# Patient Record
Sex: FEMALE | Race: WHITE | Employment: FULL TIME | URBAN - METROPOLITAN AREA
[De-identification: names, ages, dates, MRNs, and addresses within clinical notes are randomized per-mention and may not be internally consistent; named-entity substitution may affect disease eponyms.]

---

## 2020-08-07 ENCOUNTER — HOSPITAL ENCOUNTER (EMERGENCY)
Age: 45
Discharge: HOME | End: 2020-08-07
Attending: EMERGENCY MEDICINE
Payer: COMMERCIAL

## 2020-08-07 VITALS
HEART RATE: 95 BPM | RESPIRATION RATE: 16 BRPM | SYSTOLIC BLOOD PRESSURE: 125 MMHG | WEIGHT: 126 LBS | OXYGEN SATURATION: 100 % | DIASTOLIC BLOOD PRESSURE: 78 MMHG | TEMPERATURE: 96.7 F | HEIGHT: 61 IN | BODY MASS INDEX: 23.79 KG/M2

## 2020-08-07 PROCEDURE — 4500000028 HC INTERMEDIATE PROCEDURE

## 2020-08-07 PROCEDURE — 99282 EMERGENCY DEPT VISIT SF MDM: CPT

## 2020-08-07 RX ORDER — NAPROXEN 500 MG/1
500 TABLET ORAL 2 TIMES DAILY PRN
Qty: 20 TABLET | Refills: 0 | Status: SHIPPED | OUTPATIENT
Start: 2020-08-07 | End: 2020-08-17

## 2020-08-07 RX ORDER — CITALOPRAM 10 MG/1
10 TABLET ORAL DAILY
COMMUNITY

## 2020-08-07 RX ORDER — ALBUTEROL SULFATE 90 UG/1
2 AEROSOL, METERED RESPIRATORY (INHALATION) EVERY 6 HOURS PRN
COMMUNITY

## 2020-08-07 RX ORDER — DICYCLOMINE HYDROCHLORIDE 10 MG/5ML
20 SOLUTION ORAL
COMMUNITY

## 2020-08-07 RX ORDER — ATENOLOL 50 MG/1
50 TABLET ORAL DAILY
COMMUNITY

## 2020-08-07 RX ORDER — ATORVASTATIN CALCIUM 20 MG/1
20 TABLET, FILM COATED ORAL DAILY
COMMUNITY

## 2020-08-07 SDOH — HEALTH STABILITY: MENTAL HEALTH: HOW OFTEN DO YOU HAVE A DRINK CONTAINING ALCOHOL?: NEVER

## 2020-08-07 ASSESSMENT — PAIN DESCRIPTION - ORIENTATION: ORIENTATION: RIGHT

## 2020-08-07 ASSESSMENT — PAIN DESCRIPTION - DESCRIPTORS: DESCRIPTORS: SHOOTING

## 2020-08-07 ASSESSMENT — ENCOUNTER SYMPTOMS
DIARRHEA: 0
ABDOMINAL PAIN: 0
VOMITING: 0
COLOR CHANGE: 0
EYES NEGATIVE: 1
SHORTNESS OF BREATH: 0
NAUSEA: 0

## 2020-08-07 ASSESSMENT — PAIN DESCRIPTION - FREQUENCY: FREQUENCY: INTERMITTENT

## 2020-08-07 ASSESSMENT — PAIN SCALES - GENERAL: PAINLEVEL_OUTOF10: 6

## 2020-08-07 ASSESSMENT — PAIN DESCRIPTION - PAIN TYPE: TYPE: ACUTE PAIN

## 2020-08-07 NOTE — ED TRIAGE NOTES
Pt arrives with complaints of right thumb pain that just started earlier today while eating lunch.  She states she picked up a glass and felt a sharp pain to her thumb and she noticed how the thumb then started to swell, pain is worse with any type of

## 2020-08-07 NOTE — ED PROVIDER NOTES
Lamont Morgan is a right hand dominant female who works at a factory \"packing parts\". This morning at work she picked up a coffee cup and noticed pain in the dorsum of her right thumb, and had trouble gripping the cup. Pain is over the dorsum of the proximal phalynx, radiates into her forearm. She denies direct trauma. No numbness appreciated. She has no previous history of this in the past.        /78   Pulse 95   Temp 96.7 °F (35.9 °C) (Skin)   Resp 16   Ht 5' 1\" (1.549 m)   Wt 126 lb (57.2 kg)   SpO2 100%   BMI 23.81 kg/m²     I have reviewed the following from the nursing documentation:      Prior to Admission medications    Medication Sig Start Date End Date Taking? Authorizing Provider   atenolol (TENORMIN) 50 MG tablet Take 50 mg by mouth daily   Yes Historical Provider, MD   citalopram (CELEXA) 10 MG tablet Take 10 mg by mouth daily   Yes Historical Provider, MD   atorvastatin (LIPITOR) 20 MG tablet Take 20 mg by mouth daily   Yes Historical Provider, MD   dicyclomine (BENTYL) 10 MG/5ML syrup Take 20 mg by mouth 4 times daily (before meals and nightly)   Yes Historical Provider, MD   albuterol sulfate HFA (VENTOLIN HFA) 108 (90 Base) MCG/ACT inhaler Inhale 2 puffs into the lungs every 6 hours as needed for Wheezing   Yes Historical Provider, MD   naproxen (NAPROSYN) 500 MG tablet Take 1 tablet by mouth 2 times daily as needed for Pain 8/7/20 8/17/20 Yes Shamar Peguero MD       Allergies as of 08/07/2020 - Review Complete 08/07/2020   Allergen Reaction Noted    Iv [iodides] Hives 08/07/2020       Past Medical History:   Diagnosis Date    Asthma     Tachycardia         Surgical History:   Past Surgical History:   Procedure Laterality Date    HYSTERECTOMY          Family History:  History reviewed. No pertinent family history.     Social History     Socioeconomic History    Marital status: Single     Spouse name: Not on file    Number of children: Not on file    Years of education: Head: Normocephalic. Eyes:      Pupils: Pupils are equal, round, and reactive to light. Pulmonary:      Effort: Pulmonary effort is normal. No respiratory distress. Skin:     General: Skin is warm and dry. Capillary Refill: Capillary refill takes less than 2 seconds. Coloration: Skin is not pale. Findings: No erythema or rash. Comments: She has FROM of the wrist and all MP/PIP/DIP joints or the right hand, including the thumb. She has some mild soft tissue swelling of the thumb, but no evidence of cellulitis or tenosynovitis. Maximal tenderness is over the extensor pollicus longus tendon. She is able to oppose the thumb and fifth finger, but this causes pain. No focal sensory or motor deficit; all components of the brachial plexus tested are symmetric and intact. Neurological:      General: No focal deficit present. Mental Status: She is alert and oriented to person, place, and time. Psychiatric:         Mood and Affect: Mood normal.         Behavior: Behavior normal.          Procedures     MDM   No results found for this visit on 08/07/20. I estimate there is LOW risk for COMPARTMENT SYNDROME, DEEP VENOUS THROMBOSIS, SEPTIC ARTHRITIS, TENDON OR NEUROVASCULAR INJURY, thus I consider the discharge disposition reasonable. Andrew Lawton and I have discussed the diagnosis and risks, and we agree with discharging home to follow-up with their primary doctor or the referral orthopedist. We also discussed returning to the Emergency Department immediately if new or worsening symptoms occur. We have discussed the symptoms which are most concerning (e.g., changing or worsening pain, numbness, weakness) that necessitate immediate return. Final Impression    1. Thumb tendonitis        Blood pressure 125/78, pulse 95, temperature 96.7 °F (35.9 °C), temperature source Skin, resp. rate 16, height 5' 1\" (1.549 m), weight 126 lb (57.2 kg), SpO2 100 %.          Broderick Oas, MD  08/07/20 145

## 2020-08-10 NOTE — ED NOTES
I accessed this chart to send the chart to [address] as requested and after receiving the release of information. The office and the patient had both called for this information.       Major Montoya RN  08/10/20 0395

## 2023-03-03 ENCOUNTER — APPOINTMENT (OUTPATIENT)
Dept: CT IMAGING | Age: 48
End: 2023-03-03

## 2023-03-03 ENCOUNTER — APPOINTMENT (OUTPATIENT)
Dept: GENERAL RADIOLOGY | Age: 48
End: 2023-03-03

## 2023-03-03 ENCOUNTER — HOSPITAL ENCOUNTER (EMERGENCY)
Age: 48
Discharge: HOME | End: 2023-03-03

## 2023-03-03 VITALS
DIASTOLIC BLOOD PRESSURE: 88 MMHG | HEART RATE: 76 BPM | RESPIRATION RATE: 18 BRPM | TEMPERATURE: 98.1 F | SYSTOLIC BLOOD PRESSURE: 132 MMHG | OXYGEN SATURATION: 96 %

## 2023-03-03 DIAGNOSIS — R07.9 CHEST PAIN, UNSPECIFIED TYPE: ICD-10-CM

## 2023-03-03 DIAGNOSIS — N64.4 BREAST PAIN: Primary | ICD-10-CM

## 2023-03-03 LAB
ALBUMIN SERPL-MCNC: 4.1 GM/DL (ref 3.4–5)
ALP BLD-CCNC: 69 IU/L (ref 40–129)
ALT SERPL-CCNC: 15 U/L (ref 10–40)
ANION GAP SERPL CALCULATED.3IONS-SCNC: 7 MMOL/L (ref 4–16)
AST SERPL-CCNC: 21 IU/L (ref 15–37)
BASOPHILS ABSOLUTE: 0.1 K/CU MM
BASOPHILS RELATIVE PERCENT: 0.7 % (ref 0–1)
BILIRUB SERPL-MCNC: 0.6 MG/DL (ref 0–1)
BUN SERPL-MCNC: 18 MG/DL (ref 6–23)
CALCIUM SERPL-MCNC: 9 MG/DL (ref 8.3–10.6)
CHLORIDE BLD-SCNC: 104 MMOL/L (ref 99–110)
CO2: 29 MMOL/L (ref 21–32)
CREAT SERPL-MCNC: 0.6 MG/DL (ref 0.6–1.1)
D DIMER: 708 NG/ML(DDU)
DIFFERENTIAL TYPE: ABNORMAL
EKG ATRIAL RATE: 75 BPM
EKG DIAGNOSIS: NORMAL
EKG P AXIS: 57 DEGREES
EKG P-R INTERVAL: 134 MS
EKG Q-T INTERVAL: 390 MS
EKG QRS DURATION: 86 MS
EKG QTC CALCULATION (BAZETT): 435 MS
EKG R AXIS: 28 DEGREES
EKG T AXIS: 31 DEGREES
EKG VENTRICULAR RATE: 75 BPM
EOSINOPHILS ABSOLUTE: 0.5 K/CU MM
EOSINOPHILS RELATIVE PERCENT: 7.1 % (ref 0–3)
GFR SERPL CREATININE-BSD FRML MDRD: >60 ML/MIN/1.73M2
GLUCOSE SERPL-MCNC: 102 MG/DL (ref 70–99)
HCT VFR BLD CALC: 39.2 % (ref 37–47)
HEMOGLOBIN: 12.6 GM/DL (ref 12.5–16)
IMMATURE NEUTROPHIL %: 0.3 % (ref 0–0.43)
LYMPHOCYTES ABSOLUTE: 1.5 K/CU MM
LYMPHOCYTES RELATIVE PERCENT: 21.3 % (ref 24–44)
MCH RBC QN AUTO: 31.1 PG (ref 27–31)
MCHC RBC AUTO-ENTMCNC: 32.1 % (ref 32–36)
MCV RBC AUTO: 96.8 FL (ref 78–100)
MONOCYTES ABSOLUTE: 0.7 K/CU MM
MONOCYTES RELATIVE PERCENT: 9 % (ref 0–4)
NUCLEATED RBC %: 0 %
PDW BLD-RTO: 12.2 % (ref 11.7–14.9)
PLATELET # BLD: 205 K/CU MM (ref 140–440)
PMV BLD AUTO: 8.9 FL (ref 7.5–11.1)
POTASSIUM SERPL-SCNC: 3.8 MMOL/L (ref 3.5–5.1)
RBC # BLD: 4.05 M/CU MM (ref 4.2–5.4)
SEGMENTED NEUTROPHILS ABSOLUTE COUNT: 4.4 K/CU MM
SEGMENTED NEUTROPHILS RELATIVE PERCENT: 61.6 % (ref 36–66)
SODIUM BLD-SCNC: 140 MMOL/L (ref 135–145)
TOTAL IMMATURE NEUTOROPHIL: 0.02 K/CU MM
TOTAL NUCLEATED RBC: 0 K/CU MM
TOTAL PROTEIN: 6.4 GM/DL (ref 6.4–8.2)
TROPONIN T: <0.01 NG/ML
TROPONIN T: <0.01 NG/ML
WBC # BLD: 7.2 K/CU MM (ref 4–10.5)

## 2023-03-03 PROCEDURE — 6370000000 HC RX 637 (ALT 250 FOR IP): Performed by: PHYSICIAN ASSISTANT

## 2023-03-03 PROCEDURE — 84484 ASSAY OF TROPONIN QUANT: CPT

## 2023-03-03 PROCEDURE — 71045 X-RAY EXAM CHEST 1 VIEW: CPT

## 2023-03-03 PROCEDURE — 93005 ELECTROCARDIOGRAM TRACING: CPT | Performed by: PHYSICIAN ASSISTANT

## 2023-03-03 PROCEDURE — 6360000002 HC RX W HCPCS: Performed by: PHYSICIAN ASSISTANT

## 2023-03-03 PROCEDURE — 85025 COMPLETE CBC W/AUTO DIFF WBC: CPT

## 2023-03-03 PROCEDURE — 96375 TX/PRO/DX INJ NEW DRUG ADDON: CPT

## 2023-03-03 PROCEDURE — 99285 EMERGENCY DEPT VISIT HI MDM: CPT

## 2023-03-03 PROCEDURE — 71275 CT ANGIOGRAPHY CHEST: CPT

## 2023-03-03 PROCEDURE — 93010 ELECTROCARDIOGRAM REPORT: CPT | Performed by: INTERNAL MEDICINE

## 2023-03-03 PROCEDURE — 96374 THER/PROPH/DIAG INJ IV PUSH: CPT

## 2023-03-03 PROCEDURE — 96372 THER/PROPH/DIAG INJ SC/IM: CPT

## 2023-03-03 PROCEDURE — 80053 COMPREHEN METABOLIC PANEL: CPT

## 2023-03-03 PROCEDURE — 85379 FIBRIN DEGRADATION QUANT: CPT

## 2023-03-03 PROCEDURE — 2580000003 HC RX 258: Performed by: PHYSICIAN ASSISTANT

## 2023-03-03 PROCEDURE — 6360000004 HC RX CONTRAST MEDICATION: Performed by: PHYSICIAN ASSISTANT

## 2023-03-03 RX ORDER — DIPHENHYDRAMINE HYDROCHLORIDE 50 MG/ML
25 INJECTION INTRAMUSCULAR; INTRAVENOUS ONCE
Status: COMPLETED | OUTPATIENT
Start: 2023-03-03 | End: 2023-03-03

## 2023-03-03 RX ORDER — ASPIRIN 325 MG
325 TABLET ORAL ONCE
Status: COMPLETED | OUTPATIENT
Start: 2023-03-03 | End: 2023-03-03

## 2023-03-03 RX ORDER — HYDROCODONE BITARTRATE AND ACETAMINOPHEN 5; 325 MG/1; MG/1
1 TABLET ORAL EVERY 6 HOURS PRN
Qty: 10 TABLET | Refills: 0 | Status: SHIPPED | OUTPATIENT
Start: 2023-03-03 | End: 2023-03-08

## 2023-03-03 RX ORDER — 0.9 % SODIUM CHLORIDE 0.9 %
500 INTRAVENOUS SOLUTION INTRAVENOUS ONCE
Status: COMPLETED | OUTPATIENT
Start: 2023-03-03 | End: 2023-03-03

## 2023-03-03 RX ORDER — KETOROLAC TROMETHAMINE 30 MG/ML
30 INJECTION, SOLUTION INTRAMUSCULAR; INTRAVENOUS ONCE
Status: COMPLETED | OUTPATIENT
Start: 2023-03-03 | End: 2023-03-03

## 2023-03-03 RX ORDER — NAPROXEN 500 MG/1
500 TABLET ORAL 2 TIMES DAILY
Qty: 60 TABLET | Refills: 0 | Status: SHIPPED | OUTPATIENT
Start: 2023-03-03

## 2023-03-03 RX ORDER — METHYLPREDNISOLONE SODIUM SUCCINATE 125 MG/2ML
60 INJECTION, POWDER, LYOPHILIZED, FOR SOLUTION INTRAMUSCULAR; INTRAVENOUS ONCE
Status: COMPLETED | OUTPATIENT
Start: 2023-03-03 | End: 2023-03-03

## 2023-03-03 RX ORDER — HYDROCODONE BITARTRATE AND ACETAMINOPHEN 5; 325 MG/1; MG/1
1 TABLET ORAL ONCE
Status: DISCONTINUED | OUTPATIENT
Start: 2023-03-03 | End: 2023-03-03

## 2023-03-03 RX ORDER — MORPHINE SULFATE 4 MG/ML
4 INJECTION, SOLUTION INTRAMUSCULAR; INTRAVENOUS ONCE
Status: DISCONTINUED | OUTPATIENT
Start: 2023-03-03 | End: 2023-03-03 | Stop reason: HOSPADM

## 2023-03-03 RX ADMIN — METHYLPREDNISOLONE SODIUM SUCCINATE 60 MG: 125 INJECTION, POWDER, FOR SOLUTION INTRAMUSCULAR; INTRAVENOUS at 16:34

## 2023-03-03 RX ADMIN — KETOROLAC TROMETHAMINE 30 MG: 30 INJECTION, SOLUTION INTRAMUSCULAR; INTRAVENOUS at 15:09

## 2023-03-03 RX ADMIN — ASPIRIN 325 MG: 325 TABLET ORAL at 15:17

## 2023-03-03 RX ADMIN — SODIUM CHLORIDE 500 ML: 9 INJECTION, SOLUTION INTRAVENOUS at 16:40

## 2023-03-03 RX ADMIN — IOPAMIDOL 80 ML: 755 INJECTION, SOLUTION INTRAVENOUS at 17:35

## 2023-03-03 RX ADMIN — DIPHENHYDRAMINE HYDROCHLORIDE 25 MG: 50 INJECTION, SOLUTION INTRAMUSCULAR; INTRAVENOUS at 16:35

## 2023-03-03 ASSESSMENT — HEART SCORE: ECG: 1

## 2023-03-03 ASSESSMENT — PAIN SCALES - GENERAL: PAINLEVEL_OUTOF10: 5

## 2023-03-03 NOTE — Clinical Note
Logan Gibson was seen and treated in our emergency department on 3/3/2023. She may return to work on 03/06/2023. If you have any questions or concerns, please don't hesitate to call.       CANDI Montesinos

## 2023-03-03 NOTE — ED PROVIDER NOTES
Rhythm: NSR     - Ventricular rate 75  - OR interval 134  - Axis: normal  - ST changes: No STEMI  - T waves: TWI, anterior leads  - Q waves: none  - No, Q-T prolongation, WPW, Q waves suggestive of HOCM, V-tach/V-fib, A-fib, A-flutter, SVT, torsardes or Brugada.   -Previous EKG: none available on our records    EKG interpreted by me

## 2023-03-04 NOTE — ED PROVIDER NOTES
EMERGENCY DEPARTMENT ENCOUNTER        Pt Name: Mar Hernandez  MRN: [id number]  [place] 1975  Date of evaluation: 3/3/2023  Provider: CANDI Sullivan  PCP: Emir Puente DO    RUBINA. I have evaluated this patient. My supervising physician was available for consultation. Triage CHIEF COMPLAINT       Chief Complaint   Patient presents with    Breast Pain     States having right breast pain x4 days         HISTORY OF PRESENT ILLNESS      Chief Complaint: Breast pain    Mar Hernandez is a 50 y.o. female who presents to the emergency department today with a chief complaint of right-sided breast pain sent in from work. This is some has been lingering over for a procedure at work when she \"heard a popping sensation\" in her chest and then started having worse pain into the right breast.  She states that she was evaluated by the onsite nurse who was concerned that it was further evaluation. Has reproducible breast tenderness but there is no obvious signs of a palpable mass, no signs of inflammatory breast changes. No erythema or redness. No exertional chest pain. No significant cardiac history. Patient was being evaluated for palpitations but no history of ischemic valvular heart disease. No history of blood clots. No recent upper rectal infection     Nursing Notes were all reviewed and agreed with or any disagreements were addressed in the HPI. REVIEW OF SYSTEMS     At least 10 systems reviewed and otherwise acutely negative except as in the [address]      PAST MEDICAL HISTORY     Past Medical History:   Diagnosis Date    Asthma     Tachycardia        SURGICAL HISTORY     Past Surgical History:   Procedure Laterality Date    HYSTERECTOMY (CERVIX STATUS UNKNOWN)         CURRENTMEDICATIONS       Discharge Medication List as of 3/3/2023  6:56 PM        CONTINUE these medications which have NOT CHANGED    Details   atenolol (TENORMIN) 50 MG tablet Take 50 mg by mouth dailyHistorical Med citalopram (CELEXA) 10 MG tablet Take 10 mg by mouth dailyHistorical Med      atorvastatin (LIPITOR) 20 MG tablet Take 20 mg by mouth dailyHistorical Med      dicyclomine (BENTYL) 10 MG/5ML syrup Take 20 mg by mouth 4 times daily (before meals and nightly)Historical Med      albuterol sulfate HFA (VENTOLIN HFA) 108 (90 Base) MCG/ACT inhaler Inhale 2 puffs into the lungs every 6 hours as needed for WheezingHistorical Med             ALLERGIES     Iv [iodides]    FAMILYHISTORY     History reviewed. No pertinent family history. SOCIAL HISTORY       Social History     Socioeconomic History    Marital status: Single     Spouse name: None    Number of children: None    Years of education: None    Highest education level: None   Tobacco Use    Smoking status: Never    Smokeless tobacco: Never   Vaping Use    Vaping Use: Never used   Substance and Sexual Activity    Alcohol use: Never    Drug use: Never       SCREENINGS    [place] Coma Scale  Eye Opening: Spontaneous  Best Verbal Response: Oriented  Best Motor Response: Obeys commands  Jacqui Coma Scale Score: 15 Heart Score for chest pain patients  History: Slightly Suspicious  ECG: Non-Specifc repolarization disturbance/LBTB/PM  Patient Age: > 39 and < 65 years  *Risk factors for Atherosclerotic disease: Hypertension  Risk Factors: 1 or 2 risk factors  Troponin: < 1X normal limit  Heart Score Total: 3    PHYSICAL EXAM       ED Triage Vitals [03/03/23 1407]   BP Temp Temp Source Heart Rate Resp SpO2 Height Weight   109/88 98.1 °F (36.7 °C) Oral 82 15 98 % -- --      Constitutional:  Well developed, Well nourished. No distress  HENT:  Normocephalic, Atraumatic, PERRL. EOMI. Sclera clear. Conjunctiva normal, No discharge. Oropharynx is within normal limits, no tonsillar hypertrophy white exudate, tolerating secretions. Bilateral TMs are pearly white without signs of significant erythema or effusion. No perforation. No mastoid tenderness.   Neck/Lymphatics: supple, no JVD, no swollen nodes  Cardiovascular:   RRR,  no murmurs/rubs/gallops. Respiratory:   Nonlabored breathing. Normal breath sounds, No wheezing  Abdomen: Bowel sounds normal, Soft, No tenderness, no masses. :  No significant flank tenderness to percussion. Musculoskeletal:    There is no edema, asymmetry, or calf / thigh tenderness bilaterally. No cyanosis. No cool or pale-appearing limb. Distal cap refill and pulses intact bilateral upper and lower extremities  Bilateral upper and lower extremity ROM intact without pain or obvious deficit  Integument: On inspection of the right breast there is no obvious defect deformity inflammatory changes, no inversion of nipple. Fibrocystic cystic breast changes no palpable mass. Neurologic:  Alert & oriented , No focal deficits noted. Cranial nerves II through XII grossly intact. Normal gross motor coordination & motor strength bilateral upper and lower extremities  Sensation intact. Psychiatric:  Affect normal, Mood normal.     DIAGNOSTIC RESULTS   LABS:    Labs Reviewed   CBC WITH AUTO DIFFERENTIAL - Abnormal; Notable for the following components:       Result Value    RBC 4.05 (*)     MCH 31.1 (*)     Lymphocytes % 21.3 (*)     Monocytes % 9.0 (*)     Eosinophils % 7.1 (*)     All other components within normal limits   COMPREHENSIVE METABOLIC PANEL - Abnormal; Notable for the following components:    Glucose 102 (*)     All other components within normal limits   D-DIMER, QUANTITATIVE - Abnormal; Notable for the following components:    D-Dimer, Quant 708 (*)     All other components within normal limits   TROPONIN   TROPONIN       When ordered, only abnormal lab results are displayed. All other labs were within normal range or not returned as of this dictation. EKG: When ordered, EKG's are interpreted by the Emergency Department Physician in the absence of a cardiologist.  Please see their note for interpretation of EKG.     RADIOLOGY: Non-plain film images such as CT, Ultrasound and MRI are read by the radiologist. Plain radiographic images are visualized and preliminarily interpreted by the  ED Provider with the below findings:    Interpretation perthe Radiologist below, if available at the time of this note:    CTA PULMONARY W CONTRAST   Final Result   No pulmonary embolism or other acute process in the chest.         XR CHEST PORTABLE   Final Result   Medial opacity at the right lung base could represent atelectasis or infection           XR CHEST PORTABLE    Result Date: 3/3/2023  EXAMINATION: ONE XRAY VIEW OF THE CHEST 3/3/2023 2:38 pm COMPARISON: None. HISTORY: ORDERING SYSTEM PROVIDED HISTORY: Chest pain TECHNOLOGIST PROVIDED HISTORY: Reason for exam:->Chest pain Reason for Exam: Chest pain Additional signs and symptoms: NA Relevant Medical/Surgical History: ASTHMA FINDINGS: Cardiomediastinal silhouette normal.  Medial opacification at the right lung base. No pneumothorax. No pleural effusion. No pulmonary vascular congestion or edema. Medial opacity at the right lung base could represent atelectasis or infection     CTA PULMONARY W CONTRAST    Result Date: 3/3/2023  EXAMINATION: CTA OF THE CHEST 3/3/2023 5:35 pm TECHNIQUE: CTA of the chest was performed after the administration of intravenous contrast.  Multiplanar reformatted images are provided for review. MIP images are provided for review. Automated exposure control, iterative reconstruction, and/or weight based adjustment of the mA/kV was utilized to reduce the radiation dose to as low as reasonably achievable. COMPARISON: Chest radiograph 03/03/2023.  HISTORY: ORDERING SYSTEM PROVIDED HISTORY: chest pain elevated d dime ro pe TECHNOLOGIST PROVIDED HISTORY: Reason for exam:->chest pain elevated d dime ro pe Decision Support Exception - unselect if not a suspected or confirmed emergency medical condition->Emergency Medical Condition (MA) Reason for Exam: chest pain elevated d dime ro pe Additional signs and symptoms: no Relevant Medical/Surgical History: 71 ml isovue 370 used FINDINGS: Pulmonary Arteries: Pulmonary arteries are adequately opacified for evaluation. No evidence of intraluminal filling defect to suggest pulmonary embolism. Main pulmonary artery is normal in caliber. Mediastinum: The thoracic aorta is unremarkable. The coronary arteries and branch vessels of the superior mediastinum and lower neck are unremarkable. The heart is normal in size. No pericardial effusion. The mediastinal esophagus and thyroid gland are unremarkable. No lymphadenopathy or pneumomediastinum. Lungs/pleura: The central airways are patent. No pleural effusion or pneumothorax. No consolidation or interlobular septal thickening. Very mild bibasilar atelectasis. Upper Abdomen: Limited images of the upper abdomen are unremarkable. Soft Tissues/Bones: No acute bone or soft tissue abnormality.      No pulmonary embolism or other acute process in the chest.         PROCEDURES   Unless otherwise noted below, none      CRITICAL CARE   CRITICAL CARE NOTE:   N/A    CONSULTS:  None      EMERGENCY DEPARTMENT COURSE and MDM:   Vitals:    Vitals:    03/03/23 1407 03/03/23 1600 03/03/23 1832   BP: 109/88 125/87 132/88   Pulse: 82 74 76   Resp: 15 20 18   Temp: 98.1 °F (36.7 °C)     TempSrc: Oral     SpO2: 98% 100% 96%       Patient was given thefollowing medications:  Medications   ketorolac (TORADOL) injection 30 mg (30 mg IntraMUSCular Given 3/3/23 1509)   aspirin tablet 325 mg (325 mg Oral Given 3/3/23 1517)   0.9 % sodium chloride bolus (0 mLs IntraVENous Stopped 3/3/23 1757)   diphenhydrAMINE (BENADRYL) injection 25 mg (25 mg IntraVENous Given 3/3/23 1635)   methylPREDNISolone sodium (SOLU-MEDROL) injection 60 mg (60 mg IntraVENous Given 3/3/23 1634)   iopamidol (ISOVUE-370) 76 % injection 80 mL (80 mLs IntraVENous Given 3/3/23 1735)         Is this patient to be included in the SEP-1 Core Measure due to severe sepsis or septic shock? No   Exclusion criteria - the patient is NOT to be included for SEP-1 Core Measure due to: Infection is not suspected    MDM:    CC/HPI Summary, DDx, ED Course, and Reassessment:     Patient presents as above. Emergent etiologies considered. Patient seen and examined. Work-up initiated secondary to presentation, physical exam findings, vital signs and medical chart review. In brief, 57-year-old female demonstrated several days of reproducible breast pain sent in for work, does mention a \"popping sensation in chest.  Patient has no obvious breast mass/mastitis, concerning mass or signs of inflammatory breast disease/cancer. We did obtain a screening EKG that had some new mild T wave inversions in the anterior leads. Difficulty to compare this to but we will have a recent EKG to see if they were new. We did obtain a initial and secondary troponin that was negative. Patient did also have a CTA secondary to LA D-dimer study. Patient had no underlying PE. On tertiary evaluation patient still having reproducible pain, I feel like this is coming from her breast or may be musculoskeletal.  At this point have a low suspicion of ACS or other acute pathology. We will advise outpatient follow-up through primary care as well as cardiology if she develops any other cardiac symptoms but I do feel this is more likely musculoskeletal.  Given that the patient. Will be discharged home in stable condition.     History from : Patient    Limitations to history : None    Patient was given the following medications:  Medications   ketorolac (TORADOL) injection 30 mg (30 mg IntraMUSCular Given 3/3/23 1509)   aspirin tablet 325 mg (325 mg Oral Given 3/3/23 1517)   0.9 % sodium chloride bolus (0 mLs IntraVENous Stopped 3/3/23 1757)   diphenhydrAMINE (BENADRYL) injection 25 mg (25 mg IntraVENous Given 3/3/23 1635)   methylPREDNISolone sodium (SOLU-MEDROL) injection 60 mg (60 mg IntraVENous Given 3/3/23 1634)   iopamidol (ISOVUE-370) 76 % injection 80 mL (80 mLs IntraVENous Given 3/3/23 1735)       Discussion with Other Profesionals : None    Social Determinants : None    Records Reviewed : Inpatient Notes   and Outpatient Notes cardiology notes and recent work-up for palpitations    Appropriate for outpatient management close monitoring symptoms, following up with primary care/OB/GYN as well as given information for cardiology follow-up    I am the Primary Clinician of Record. CLINICAL IMPRESSION      1. Breast pain    2. Chest pain, unspecified type          DISPOSITION/PLAN   DISPOSITION Decision To Discharge 03/03/2023 06:34:06 PM      PATIENT REFERREDTO:  Geetha Carreon, DO  [address]  [phone]    Schedule an appointment as soon as possible for a visit       Casper Gallardo MD  [address] [address]  [phone]    Schedule an appointment as soon as possible for a visit         DISCHARGE MEDICATIONS:  Discharge Medication List as of 3/3/2023  6:56 PM        START taking these medications    Details   HYDROcodone-acetaminophen (NORCO) 5-325 MG per tablet Take 1 tablet by mouth every 6 hours as needed for Pain for up to 5 days. Max Daily Amount: 4 tablets, Disp-10 tablet, R-0Normal      naproxen (NAPROSYN) 500 MG tablet Take 1 tablet by mouth 2 times daily, Disp-60 tablet, R-0Normal             DISCONTINUED MEDICATIONS:  Discharge Medication List as of 3/3/2023  6:56 PM                 (Please note that portions ofthis note were completed with a voice recognition program.  Efforts were made to edit the dictations but occasionally words are mis-transcribed. )    CANDI Kebede (electronically signed)             CANDI Smith  03/04/23 0899

## 2023-03-05 ENCOUNTER — APPOINTMENT (OUTPATIENT)
Dept: GENERAL RADIOLOGY | Age: 48
End: 2023-03-05

## 2023-03-05 ENCOUNTER — HOSPITAL ENCOUNTER (EMERGENCY)
Age: 48
Discharge: HOME | End: 2023-03-05
Attending: EMERGENCY MEDICINE

## 2023-03-05 VITALS
TEMPERATURE: 97.9 F | HEART RATE: 80 BPM | OXYGEN SATURATION: 95 % | DIASTOLIC BLOOD PRESSURE: 85 MMHG | RESPIRATION RATE: 19 BRPM | SYSTOLIC BLOOD PRESSURE: 127 MMHG

## 2023-03-05 DIAGNOSIS — R07.9 CHEST PAIN, UNSPECIFIED TYPE: Primary | ICD-10-CM

## 2023-03-05 LAB
ALBUMIN SERPL-MCNC: 4.1 GM/DL (ref 3.4–5)
ALP BLD-CCNC: 58 IU/L (ref 40–129)
ALT SERPL-CCNC: 14 U/L (ref 10–40)
ANION GAP SERPL CALCULATED.3IONS-SCNC: 9 MMOL/L (ref 4–16)
AST SERPL-CCNC: 19 IU/L (ref 15–37)
BASOPHILS ABSOLUTE: 0 K/CU MM
BASOPHILS RELATIVE PERCENT: 0.6 % (ref 0–1)
BILIRUB SERPL-MCNC: 0.7 MG/DL (ref 0–1)
BUN SERPL-MCNC: 15 MG/DL (ref 6–23)
CALCIUM SERPL-MCNC: 9 MG/DL (ref 8.3–10.6)
CHLORIDE BLD-SCNC: 103 MMOL/L (ref 99–110)
CO2: 25 MMOL/L (ref 21–32)
CREAT SERPL-MCNC: 0.5 MG/DL (ref 0.6–1.1)
DIFFERENTIAL TYPE: ABNORMAL
EOSINOPHILS ABSOLUTE: 0.3 K/CU MM
EOSINOPHILS RELATIVE PERCENT: 4.3 % (ref 0–3)
GFR SERPL CREATININE-BSD FRML MDRD: >60 ML/MIN/1.73M2
GLUCOSE SERPL-MCNC: 90 MG/DL (ref 70–99)
HCT VFR BLD CALC: 39.3 % (ref 37–47)
HEMOGLOBIN: 12.6 GM/DL (ref 12.5–16)
IMMATURE NEUTROPHIL %: 0.2 % (ref 0–0.43)
LIPASE: 48 IU/L (ref 13–60)
LYMPHOCYTES ABSOLUTE: 2 K/CU MM
LYMPHOCYTES RELATIVE PERCENT: 30.1 % (ref 24–44)
MAGNESIUM: 2.2 MG/DL (ref 1.8–2.4)
MCH RBC QN AUTO: 31.4 PG (ref 27–31)
MCHC RBC AUTO-ENTMCNC: 32.1 % (ref 32–36)
MCV RBC AUTO: 98 FL (ref 78–100)
MONOCYTES ABSOLUTE: 0.6 K/CU MM
MONOCYTES RELATIVE PERCENT: 8.7 % (ref 0–4)
NUCLEATED RBC %: 0 %
PDW BLD-RTO: 12.2 % (ref 11.7–14.9)
PLATELET # BLD: 180 K/CU MM (ref 140–440)
PMV BLD AUTO: 9.3 FL (ref 7.5–11.1)
POTASSIUM SERPL-SCNC: 4 MMOL/L (ref 3.5–5.1)
RBC # BLD: 4.01 M/CU MM (ref 4.2–5.4)
SEGMENTED NEUTROPHILS ABSOLUTE COUNT: 3.7 K/CU MM
SEGMENTED NEUTROPHILS RELATIVE PERCENT: 56.1 % (ref 36–66)
SODIUM BLD-SCNC: 137 MMOL/L (ref 135–145)
TOTAL IMMATURE NEUTOROPHIL: 0.01 K/CU MM
TOTAL NUCLEATED RBC: 0 K/CU MM
TOTAL PROTEIN: 6.3 GM/DL (ref 6.4–8.2)
TROPONIN T: <0.01 NG/ML
WBC # BLD: 6.6 K/CU MM (ref 4–10.5)

## 2023-03-05 PROCEDURE — 71045 X-RAY EXAM CHEST 1 VIEW: CPT

## 2023-03-05 PROCEDURE — 96375 TX/PRO/DX INJ NEW DRUG ADDON: CPT

## 2023-03-05 PROCEDURE — 99285 EMERGENCY DEPT VISIT HI MDM: CPT

## 2023-03-05 PROCEDURE — 6370000000 HC RX 637 (ALT 250 FOR IP): Performed by: EMERGENCY MEDICINE

## 2023-03-05 PROCEDURE — 83735 ASSAY OF MAGNESIUM: CPT

## 2023-03-05 PROCEDURE — 6360000002 HC RX W HCPCS: Performed by: EMERGENCY MEDICINE

## 2023-03-05 PROCEDURE — 93005 ELECTROCARDIOGRAM TRACING: CPT | Performed by: EMERGENCY MEDICINE

## 2023-03-05 PROCEDURE — 96374 THER/PROPH/DIAG INJ IV PUSH: CPT

## 2023-03-05 PROCEDURE — 83690 ASSAY OF LIPASE: CPT

## 2023-03-05 PROCEDURE — 85025 COMPLETE CBC W/AUTO DIFF WBC: CPT

## 2023-03-05 PROCEDURE — 84484 ASSAY OF TROPONIN QUANT: CPT

## 2023-03-05 PROCEDURE — 80053 COMPREHEN METABOLIC PANEL: CPT

## 2023-03-05 RX ORDER — KETOROLAC TROMETHAMINE 30 MG/ML
30 INJECTION, SOLUTION INTRAMUSCULAR; INTRAVENOUS ONCE
Status: COMPLETED | OUTPATIENT
Start: 2023-03-05 | End: 2023-03-05

## 2023-03-05 RX ORDER — LIDOCAINE 4 G/G
1 PATCH TOPICAL ONCE
Status: DISCONTINUED | OUTPATIENT
Start: 2023-03-05 | End: 2023-03-05 | Stop reason: HOSPADM

## 2023-03-05 RX ADMIN — KETOROLAC TROMETHAMINE 30 MG: 30 INJECTION, SOLUTION INTRAMUSCULAR; INTRAVENOUS at 19:42

## 2023-03-05 RX ADMIN — HYDROMORPHONE HYDROCHLORIDE 0.5 MG: 1 INJECTION, SOLUTION INTRAMUSCULAR; INTRAVENOUS; SUBCUTANEOUS at 19:42

## 2023-03-05 ASSESSMENT — PAIN SCALES - GENERAL: PAINLEVEL_OUTOF10: 10

## 2023-03-05 NOTE — Clinical Note
Hawa Cota was seen and treated in our emergency department on 3/5/2023. She may return to work on 03/07/2023. If you have any questions or concerns, please don't hesitate to call.       Camacho Choe, APRN - CNP

## 2023-03-05 NOTE — ED PROVIDER NOTES
Finishes marijuana        [address] ENCOUNTER      Pt Name: Maya Mcfarlane  MRN: [id number]  [place] 1975  Date of evaluation: 3/5/2023  Provider: Ulysses Carmine, APRN - CNP  PCP: Elizabeth Barakat DO  Note Started: 9:54 AM EST 3/9/23    CHIEF COMPLAINT       Chief Complaint   Patient presents with    Chest Pain     Seen Friday for same sx and is no better       HISTORY OF PRESENT ILLNESS: 1 or more Elements   Maya Mcfarlane is a 50 y.o. female who presents to the emergency department with right sided chest /breast pain. Onset was 2 days ago and started while at work. She was evaluated in the emergency department initially. At that time a full work up was done including CTA of the chest which was negative for PE. Pt reports the pain has continued. She admits however that she does did not  the Rxs previously prescribed to help manage her pain because she does not like to take medication. She denies associated symptoms No Relieving factors . There is no known history of DVT, PE, or thoracic aortic dissection. The patient denies leg pain or leg swelling. The patient denies fevers, chills, neck pain, shortness of breath, cough, hemoptysis, abdominal pain, nausea, vomiting, numbness, tingling, weakness, or any other complaints. I have reviewed the nursing triage documentation and agree unless otherwise noted. REVIEW OF SYSTEMS :    Review of Systems   Constitutional:  Negative for appetite change, fatigue and fever. HENT:  Negative for hearing loss and sore throat. Respiratory:  Positive for chest tightness. Negative for cough, shortness of breath, wheezing and stridor. Cardiovascular:  Positive for chest pain. Negative for palpitations and leg swelling. Gastrointestinal:  Negative for abdominal pain, nausea and vomiting. Genitourinary:  Negative for dysuria.    Musculoskeletal:  Negative for back pain. Skin:  Negative for pallor and rash. Neurological:  Negative for dizziness and syncope. Psychiatric/Behavioral:  Negative for confusion. Positives and Pertinent negatives as per HPI. SURGICAL HISTORY     Past Surgical History:   Procedure Laterality Date    HYSTERECTOMY (CERVIX STATUS UNKNOWN)         [address]       Discharge Medication List as of 3/5/2023  9:42 PM        CONTINUE these medications which have NOT CHANGED    Details   HYDROcodone-acetaminophen (NORCO) 5-325 MG per tablet Take 1 tablet by mouth every 6 hours as needed for Pain for up to 5 days. Max Daily Amount: 4 tablets, Disp-10 tablet, R-0Normal      naproxen (NAPROSYN) 500 MG tablet Take 1 tablet by mouth 2 times daily, Disp-60 tablet, R-0Normal      atenolol (TENORMIN) 50 MG tablet Take 50 mg by mouth dailyHistorical Med      citalopram (CELEXA) 10 MG tablet Take 10 mg by mouth dailyHistorical Med      atorvastatin (LIPITOR) 20 MG tablet Take 20 mg by mouth dailyHistorical Med      dicyclomine (BENTYL) 10 MG/5ML syrup Take 20 mg by mouth 4 times daily (before meals and nightly)Historical Med      albuterol sulfate HFA (VENTOLIN HFA) 108 (90 Base) MCG/ACT inhaler Inhale 2 puffs into the lungs every 6 hours as needed for WheezingHistorical Med             ALLERGIES     Iv [iodides] and Penicillins    FAMILYHISTORY     History reviewed. No pertinent family history. SOCIAL HISTORY       Social History     Tobacco Use    Smoking status: Never    Smokeless tobacco: Never   Vaping Use    Vaping Use: Never used   Substance Use Topics    Alcohol use: Never    Drug use: Never       SCREENINGS   Heart score 2 - Low  PHYSICAL EXAM:      ED Triage Vitals [03/05/23 1701]   BP Temp Temp src Heart Rate Resp SpO2 Height Weight   129/76 97.9 °F (36.6 °C) -- 89 18 97 % -- --      Physical Exam    Constitutional:  Well developed, Well nourished. No distress  HENT:  Normocephalic, Atraumatic, PERRL. EOMI.   Sclera clear.Conjunctiva normal, No discharge. Neck/Lymphatics: supple, no JVD, no swollen nodes  Cardiovascular:   RRR,  no murmurs/rubs/gallops. Equal bilateral radial pulses. Respiratory:   Nonlabored breathing. Normal breath sounds, No wheezing  Abdomen: Bowel sounds normal, Soft, No tenderness, no masses. Musculoskeletal:    Bilateral upper and lower extremity ROM intact without pain or obvious deficit. Patient is tender to even light touch of the breast as well as in the right shoulder/scapular region with no overlying skin finding changes. No open wound, erythremia, discoloration,   Integument:  Warm, Pink, Dry  Neurologic:  Alert & oriented , No focal deficits noted. Cranial nerves II through XII grossly intact. Normal gross motor coordination & motor strength bilateral upper and lower extremities  Sensation intact. Psychiatric:  Affect normal, Mood normal.     DIAGNOSTIC RESULTS     Labs Reviewed   CBC WITH AUTO DIFFERENTIAL - Abnormal; Notable for the following components:       Result Value    RBC 4.01 (*)     MCH 31.4 (*)     Monocytes % 8.7 (*)     Eosinophils % 4.3 (*)     All other components within normal limits   COMPREHENSIVE METABOLIC PANEL - Abnormal; Notable for the following components:    Creatinine 0.5 (*)     Total Protein 6.3 (*)     All other components within normal limits   TROPONIN   LIPASE   MAGNESIUM     I have reviewed and interpreted all of the currently available lab results from this visit (if applicable) Only abnormal lab results are displayed. All other labs were within normal range or not returned as of this dictation. EKG: When ordered, EKG's are interpreted by the Emergency Department Physician in the absence of a cardiologist.  Please see their note for interpretation of EKG.     Radiographs (if obtained) as interpreted by me and confirmed with a radiologist report  XR CHEST PORTABLE   Final Result   Possible right lower lobe pneumonia             Chart review shows recent radiograph(s):  XR CHEST PORTABLE    Result Date: 3/5/2023  EXAMINATION: ONE XRAY VIEW OF THE CHEST 3/5/2023 5:49 pm COMPARISON: 03/03/2023 HISTORY: ORDERING SYSTEM PROVIDED HISTORY: chest pain TECHNOLOGIST PROVIDED HISTORY: Reason for exam:->chest pain Reason for Exam: chest pain FINDINGS: Cardiomediastinal silhouette stable. Right basilar airspace opacity. No pulmonary vascular congestion or edema. No pneumothorax. Possible right lower lobe pneumonia     XR CHEST PORTABLE    Result Date: 3/3/2023  EXAMINATION: ONE XRAY VIEW OF THE CHEST 3/3/2023 2:38 pm COMPARISON: None. HISTORY: ORDERING SYSTEM PROVIDED HISTORY: Chest pain TECHNOLOGIST PROVIDED HISTORY: Reason for exam:->Chest pain Reason for Exam: Chest pain Additional signs and symptoms: NA Relevant Medical/Surgical History: ASTHMA FINDINGS: Cardiomediastinal silhouette normal.  Medial opacification at the right lung base. No pneumothorax. No pleural effusion. No pulmonary vascular congestion or edema. Medial opacity at the right lung base could represent atelectasis or infection     CTA PULMONARY W CONTRAST    Result Date: 3/3/2023  EXAMINATION: CTA OF THE CHEST 3/3/2023 5:35 pm TECHNIQUE: CTA of the chest was performed after the administration of intravenous contrast.  Multiplanar reformatted images are provided for review. MIP images are provided for review. Automated exposure control, iterative reconstruction, and/or weight based adjustment of the mA/kV was utilized to reduce the radiation dose to as low as reasonably achievable. COMPARISON: Chest radiograph 03/03/2023.  HISTORY: ORDERING SYSTEM PROVIDED HISTORY: chest pain elevated d dime ro pe TECHNOLOGIST PROVIDED HISTORY: Reason for exam:->chest pain elevated d dime ro pe Decision Support Exception - unselect if not a suspected or confirmed emergency medical condition->Emergency Medical Condition (MA) Reason for Exam: chest pain elevated d dime ro pe Additional signs and symptoms: no Relevant Medical/Surgical History: 71 ml isovue 370 used FINDINGS: Pulmonary Arteries: Pulmonary arteries are adequately opacified for evaluation. No evidence of intraluminal filling defect to suggest pulmonary embolism. Main pulmonary artery is normal in caliber. Mediastinum: The thoracic aorta is unremarkable. The coronary arteries and branch vessels of the superior mediastinum and lower neck are unremarkable. The heart is normal in size. No pericardial effusion. The mediastinal esophagus and thyroid gland are unremarkable. No lymphadenopathy or pneumomediastinum. Lungs/pleura: The central airways are patent. No pleural effusion or pneumothorax. No consolidation or interlobular septal thickening. Very mild bibasilar atelectasis. Upper Abdomen: Limited images of the upper abdomen are unremarkable. Soft Tissues/Bones: No acute bone or soft tissue abnormality. No pulmonary embolism or other acute process in the chest.       PROCEDURES   Unless otherwise noted below, none     Procedures    CRITICAL CARE TIME (.cctime)     PAST MEDICAL HISTORY AND CHRONIC CONDITIONS AFFECTING CARE   Pt  has a past medical history of Asthma and Tachycardia. CC/HPI, SUMMARY, DDx, ED COURSE, AND REASSESSMENT   I am the Primary Clinician of Record. RUBINA. I have evaluated this patient. My supervising physician was available for consultation. Vitals:    03/05/23 1746 03/05/23 1806 03/05/23 1816 03/05/23 2131   BP: 127/87 127/84 130/81 127/85   Pulse: 86 88 85 80   Resp: 19 23 13 19   Temp:       SpO2:    95%       Is this patient to be included in the SEP-1 Core Measure due to severe sepsis or septic shock? No   Exclusion criteria - the patient is NOT to be included for SEP-1 Core Measure due to: Infection is not suspected    Donna Julien is an alert and oriented x4, 50 y.o. female who presents to the ER with chief complaint of right sided chest pain.   Upon further evaluation the pain is actually more in her right breast.  Information obtained from Patient. Pain is described as sharp stabbing and rated 10 out of 10. Emergent etiologies considered. José Jones has easy nonlabored respirations. Vital signs within acceptable parameters. Patient is afebrile and in no acute distress. Pt hemodynamically stable and neurovascularly intact. Cardiac monitoring and continuous pulse ox ordered to ensure patient remains hemodynamically stable particularly given any drug therapy. Will treat patient for symptoms. Patient was treated with the following medications:  Medications   HYDROmorphone (DILAUDID) injection 0.5 mg (0.5 mg IntraVENous Given 3/5/23 1942)   ketorolac (TORADOL) injection 30 mg (30 mg IntraVENous Given 3/5/23 1942)     HPI, ROS and PE as noted above, differential diagnosis includes but is not limited to MI, pericarditis, aortic dissection, pneumothorax, valvular disease, pulmonary HTN, pneumonia, pleuritis, GERD, PUD, biliary disease, pancreatitis, anxiety or musculoskeletal.        History and exam not consistent with pneumothorax or pneumonia as patient is afebrile and is without cough. Given chronicity and no radiation to back, low suspicion of dissection. Unlikely TAA as the pain is not ripping or tearing and no radiation to back. Low probability of PE given negative Wells/PERC criteria as well as no tachycardia, no SOB, no recent immobilization, no recent surgery. Presentation not consistent with other acute, emergent causes of chest pain currently. Chest Pain Reeval  cbc notable for normal white count without  bands making inflammatory processes or metabolic abnormalities, unlikely. No pancytopenia which would be concerning for cancer, lupus or infection. Hemoglobin and hematocrit are within normal parameters making concern for anemia unlikely.   Metabolic panel to evaluate for electrolyte abnormality show sodium and potassium are normal indicating a low likelihood of hypo or hypernatremia, as well as hypo or hyperkalemia. BUN and creatinine are normal indicating a low likelihood of acute kidney injury or renal failure. Liver function enzymes are normal indicating a low likelihood of acute cholecystitis or hepatitis. ECG shows no ST elevations and cardiac enzymes are normal indicating a low likelihood of STEMI or NSTEMI. Chest x-ray was completed and concerning for pneumonia, however review of previous xray also showed same concern, yet cat scan from 2 days ago showed no pneumonia. As cat scan was just done two days ago, the repeat CT not ordered as the risk of another CT is likely higher than the risk of new findings in such a short period of time. Upon reevaluation pt reports much improvement of her discomfort after being me    CONSULTS: None      Disposition  Discharge Home/Low ACS Risk: Patient's symptoms have resolved following medication administration and I feel that the patient is able to be safely discharged home with outpatient follow up. I have discussed with the patient my clinical impression and the result of the Heart score to screen for ACS, as well as the risks of further testing and hospitalization. The Heart score shows that the risk for ACS is less than 2% or 1%, respectively. Although the risk of ACS has not been eliminated, the risks of further testing or hospitalization likely exceed the benefit, and the patient declines further emergent evaluation or hospitalization for ACS. Pt encouraged to  her prescriptions from the pharmacy that were prescribed 2 days ago to help manage her symptoms. She is encouraged also to follow-up with her primary care provider in the next 2 to 3 days. FINAL IMPRESSION      1.  Chest pain, unspecified type          DISPOSITION/PLAN   DISPOSITION Decision To Discharge 03/05/2023 08:36:46 PM      PATIENT REFERRED TO:  Rober Baker DO  [address] [facility]  [address] [place] [address]  [phone]    Schedule an appointment as soon as possible for a visit in 3 days  follow up    Wandy Torres MD  [address] [address]  [phone]    Schedule an appointment as soon as possible for a visit in 3 days  follow up      DISCHARGE MEDICATIONS:  Discharge Medication List as of 3/5/2023  9:42 PM          DISCONTINUED MEDICATIONS:  Discharge Medication List as of 3/5/2023  9:42 PM        (Please note that portions of this note were completed with a voice recognition program.  Efforts were made to edit the dictations but occasionally words are mis-transcribed.)    LISA Nick CNP (electronically signed)      LISA Nick CNP  03/11/23 1100

## 2023-03-05 NOTE — Clinical Note
Dee Ramos was seen and treated in our emergency department on 3/5/2023. She may return to work on 03/07/2023. If you have any questions or concerns, please don't hesitate to call.       Scot Altman, LISA - CNP

## 2023-03-06 NOTE — ED PROVIDER NOTES
I independently examined and evaluated Carmen Bess. I personally saw the patient and performed a substantive portion of the visit including all aspects of the medical decision making. In brief their history revealed patient is here with several days of right-sided breast pain and right shoulder pain. Patient reports that she was evaluated here just 2 days ago for the above and had reassuring work-up. Patient was prescribed pain medication but she \"does not like to take pills\". No new symptoms. Chest pain is described as \"in my right breast\", sharp, stabbing, 10 out of 10 and worse with even light touch. Pain does extend into the right shoulder blade and is of similar quality. Patient denies any inciting event or trauma. No fevers or coughing. No shortness of breath. No nausea or sweating. Patient does have chronic irritable bowel syndrome issues which are unchanged at this time. Their focused exam revealed the patient is afebrile and hemodynamically stable on room air. The patient appears age appropriate, appears well-hydrated, well-nourished. Appears overall nontoxic. Mucous membranes are moist. Speech is clear. Breathing is unlabored. Chest wall and breast are examined with chaperone and appear normal.  Nipple is pierced. Even light touching of the skin and subcutaneous tissue of this area reproduces pain exactly. Skin is dry. Mental status is normal. The patient moves all extremities and is without facial droop.     Results for orders placed or performed during the hospital encounter of 03/05/23   CBC with Auto Differential   Result Value Ref Range    WBC 6.6 4.0 - 10.5 K/CU MM    RBC 4.01 (L) 4.2 - 5.4 M/CU MM    Hemoglobin 12.6 12.5 - 16.0 GM/DL    Hematocrit 39.3 37 - 47 %    MCV 98.0 78 - 100 FL    MCH 31.4 (H) 27 - 31 PG    MCHC 32.1 32.0 - 36.0 %    RDW 12.2 11.7 - 14.9 %    Platelets 311 388 - 619 K/CU MM    MPV 9.3 7.5 - 11.1 FL    Differential Type AUTOMATED DIFFERENTIAL Segs Relative 56.1 36 - 66 %    Lymphocytes % 30.1 24 - 44 %    Monocytes % 8.7 (H) 0 - 4 %    Eosinophils % 4.3 (H) 0 - 3 %    Basophils % 0.6 0 - 1 %    Segs Absolute 3.7 K/CU MM    Lymphocytes Absolute 2.0 K/CU MM    Monocytes Absolute 0.6 K/CU MM    Eosinophils Absolute 0.3 K/CU MM    Basophils Absolute 0.0 K/CU MM    Nucleated RBC % 0.0 %    Total Nucleated RBC 0.0 K/CU MM    Total Immature Neutrophil 0.01 K/CU MM    Immature Neutrophil % 0.2 0 - 0.43 %   Comprehensive Metabolic Panel   Result Value Ref Range    Sodium 137 135 - 145 MMOL/L    Potassium 4.0 3.5 - 5.1 MMOL/L    Chloride 103 99 - 110 mMol/L    CO2 25 21 - 32 MMOL/L    BUN 15 6 - 23 MG/DL    Creatinine 0.5 (L) 0.6 - 1.1 MG/DL    Est, Glom Filt Rate >60 >60 mL/min/1.73m2    Glucose 90 70 - 99 MG/DL    Calcium 9.0 8.3 - 10.6 MG/DL    Albumin 4.1 3.4 - 5.0 GM/DL    Total Protein 6.3 (L) 6.4 - 8.2 GM/DL    Total Bilirubin 0.7 0.0 - 1.0 MG/DL    ALT 14 10 - 40 U/L    AST 19 15 - 37 IU/L    Alkaline Phosphatase 58 40 - 129 IU/L    Anion Gap 9 4 - 16   Troponin   Result Value Ref Range    Troponin T <0.010 <0.01 NG/ML   Lipase   Result Value Ref Range    Lipase 48 13 - 60 IU/L   Magnesium   Result Value Ref Range    Magnesium 2.2 1.8 - 2.4 mg/dl         12 lead EKG per my interpretation:  Normal Sinus Rhythm at 90  Axis is   Normal  QTc is  within an acceptable range  There is no specific T wave changes appreciated. There is no specific ST wave changes appreciated. No STEMI    Prior EKG to compare with was available and no clinically significant change no morphology compared to prior. ED course:   CC/HPI Summary, DDx, ED Course, and Reassessment:   Pt presents as above. Emergent conditions considered. Presentation prompted initial labs, EKG and imaging. IV is established and IV symptomatic treatment of Dilaudid and Toradol are given. Lidoderm patch is applied. Work-up is overall reassuring again including negative troponin.   X-ray of chest does demonstrate possible right-sided pneumonia however her last x-ray looked the same and CT imaging was negative. No PE on that imaging as well. Patient is exquisitely tender to even light touch of the breast in the right shoulder/scapular region with no overlying skin finding changes. I have high suspicion for musculoskeletal or soft tissue process. Given the clean CAT scan just 2 days ago repeated CT imaging is not pursued. Patient is overall hemodynamically stable and nontoxic and appropriate for further outpatient follow-up. Patient is already prescribed pain medications and encouraged her to use them in the short-term and she is understanding of this. Encourage PCP follow-up for recheck. History from : Patient    Limitations to history : None    Patient was given the following medications:  Medications   lidocaine 4 % external patch 1 patch (1 patch TransDERmal Patch Applied 3/5/23 1943)   HYDROmorphone (DILAUDID) injection 0.5 mg (0.5 mg IntraVENous Given 3/5/23 1942)   ketorolac (TORADOL) injection 30 mg (30 mg IntraVENous Given 3/5/23 1942)       Independent Imaging Interpretation by me: NA    Chronic conditions affecting care: NA    Discussion with Other Profesionals : None    Social Determinants : None    Records Reviewed : External ED Note including 1 for abdominal pain just over a week ago at [facility] emergency department. Disposition Considerations (tests considered but not done, Shared Decision Making, Pt Expectation of Test or Tx.):   Appropriate for outpatient management        Questions sought and answered with the patient. They voice understanding and agree with plan. Instructed to return for any worsening or worrisome concerns. I am the Primary Clinician of Record. Is this patient to be included in the SEP-1 Core Measure due to severe sepsis or septic shock?    No   Exclusion criteria - the patient is NOT to be included for SEP-1 Core Measure due to:  Infection is not suspected          All decisions regarding differential diagnosis, lab/radiology/EKG interpretation, risk of significant illness, specific reasons for performing tests, management/treatment, response to management/treatment, disposition, reasons for consults, results of consults, etc. were made by myself in conjunction with the Advanced Practice Provider. For all further details of the patient's emergency department visit, please see the Advanced Practice Provider's documentation.         Bebeto Palacio MD  03/05/23 7851

## 2023-03-07 LAB
EKG ATRIAL RATE: 90 BPM
EKG DIAGNOSIS: NORMAL
EKG P AXIS: 50 DEGREES
EKG P-R INTERVAL: 130 MS
EKG Q-T INTERVAL: 380 MS
EKG QRS DURATION: 90 MS
EKG QTC CALCULATION (BAZETT): 464 MS
EKG R AXIS: 18 DEGREES
EKG T AXIS: 26 DEGREES
EKG VENTRICULAR RATE: 90 BPM

## 2023-03-07 PROCEDURE — 93010 ELECTROCARDIOGRAM REPORT: CPT | Performed by: INTERNAL MEDICINE

## 2023-03-09 ASSESSMENT — ENCOUNTER SYMPTOMS
SHORTNESS OF BREATH: 0
COUGH: 0
STRIDOR: 0
SORE THROAT: 0
ABDOMINAL PAIN: 0
CHEST TIGHTNESS: 1
NAUSEA: 0
BACK PAIN: 0
WHEEZING: 0
VOMITING: 0

## 2023-03-14 ENCOUNTER — OFFICE VISIT (OUTPATIENT)
Dept: CARDIOLOGY CLINIC | Age: 48
End: 2023-03-14

## 2023-03-14 VITALS
HEART RATE: 92 BPM | HEIGHT: 61 IN | BODY MASS INDEX: 26.06 KG/M2 | WEIGHT: 138 LBS | SYSTOLIC BLOOD PRESSURE: 106 MMHG | DIASTOLIC BLOOD PRESSURE: 62 MMHG

## 2023-03-14 DIAGNOSIS — E78.5 DYSLIPIDEMIA: ICD-10-CM

## 2023-03-14 DIAGNOSIS — R00.0 TACHYCARDIA: ICD-10-CM

## 2023-03-14 DIAGNOSIS — R07.89 OTHER CHEST PAIN: Primary | ICD-10-CM

## 2023-03-14 PROCEDURE — 99204 OFFICE O/P NEW MOD 45 MIN: CPT | Performed by: INTERNAL MEDICINE

## 2023-03-14 RX ORDER — ASPIRIN 81 MG/1
81 TABLET ORAL DAILY
COMMUNITY

## 2023-03-14 RX ORDER — HYDROCODONE BITARTRATE AND ACETAMINOPHEN 5; 325 MG/1; MG/1
1 TABLET ORAL EVERY 6 HOURS PRN
COMMUNITY

## 2023-03-14 RX ORDER — PAROXETINE 10 MG/1
10 TABLET, FILM COATED ORAL 3 TIMES DAILY
COMMUNITY

## 2023-03-14 NOTE — PROGRESS NOTES
Vein \"LEG PROBLEM Questionnaire\"  Do you have prominent leg veins? Yes   Do you have any skin discoloration? No  Do you have any healed or active sores? No  Do you have swelling of the legs? No  Do you have a family history of varicose veins? Yes  Does your profession involve pro-longed        standing or heavy lifting? Yes  7. Have you been fighting overweight problems? No  8. Do you have restless legs? Yes  9. Do you have any night time cramps? Yes  10. Do you have any of the following in your legs:        Aching and Tiredness I  11. If Yes - Have they worn compression stockings No  12.  If they have worn compression stockings

## 2023-03-14 NOTE — LETTER
March 14, 2023      Marychuy Guerra, [address]      Patient: Treva Kiser   MR Number: [id number]   YOB: 1975   Date of Visit: 3/14/2023       Dear Marychuy Guerra:    Thank you for referring Treva Kiser to me for evaluation/treatment. Below are the relevant portions of my assessment and plan of care. If you have questions, please do not hesitate to call me. I look forward to following Levar Ptarick along with you.     Sincerely,        Patricia Rodriguez MD

## 2023-03-14 NOTE — PROGRESS NOTES
CARDIAC CONSULT NOTE       Manasa Hutson  50 y.o.  female    Chief Complaint   Patient presents with    Chest Pain    Palpitations       Referring physician: Ashley Peña DO     Primary care physician: Ashley Peña DO    History of Present Illness:     Manasa Hutson is a 50 y.o. female referred for evaluation and management of chest pain complaints. CHEST PAIN:  When did it began: 2 weeks ago, right sided. How long does it last: all day  How severe: 5/10  Radiation:to lower part of right chest & back. Aggravating factors:no  Relieving factors:no  Associated features:no  Tenderness to palpation:Yes on the right side. Patient has family H/O CAD on both sides of the family. In addition patient has H/O Palpitations, she is on Tenormin. has a past medical history of Asthma and Tachycardia. has a past surgical history that includes Hysterectomy. reports that she has never smoked. She has never used smokeless tobacco. She reports current alcohol use. She reports that she does not use drugs. family history includes Heart Attack in her cousin, maternal grandfather, maternal grandmother, paternal aunt, paternal grandfather, paternal grandmother, and paternal uncle; Heart Surgery in her paternal aunt and paternal grandmother; Stroke in her maternal grandmother and paternal aunt; Supraventricular tachycardia  in her sister.     Review of Systems:   Cardiovascular: No chest pain, dyspnea on exertion, palpitations or loss of consciousness  Respiratory: No cough or wheezing    Musculoskeletal:  No gait disturbance, weakness, muscle cramps, aches & pains or joint complaints  Neurological: No TIA or stroke symptoms  Psychiatric: No anxiety or depression  Hematologic/Lymphatic: No bleeding problems, blood clots or swollen lymph nodes    Physical Examination:    /62 (Site: Left Upper Arm, Position: Sitting, Cuff Size: Medium Adult)   Pulse 92   Ht 5' 1\" (1.549 m)   Wt 138 lb (62.6 kg)   BMI 26.07 kg/m²    Wt Readings from Last 3 Encounters:   03/14/23 138 lb (62.6 kg)   08/07/20 126 lb (57.2 kg)     Body mass index is 26.07 kg/m². General Appearance:  Non-obese/Well Nourished  1. Skin: It is warm & dry. No rashes noted. 2. Eyes: No conjunctival Pallor seen. No jaundice noted. 3. Neck: is supple there is no elevation of JVD. No thyromegaly  4. Respiratory:  Resp Assessment: No abnormal findings. Resp Auscultation: Vesicular breath sounds without rales or wheezing. 5. Cardiovascular: Has tenderness below the clavicle on the right side. Auscultation: Normal S1 & S2, No prominent murmurs  Carotid Arteries: No bruits present  Abdominal Aorta: Non-palpable  Pedal Pulses: 2+ and equal   6. Abdomen:  No masses or tenderness  Liver/Spleen: No Abnormalities Noted, no organomegaly. 7. Musculoskeletal: No joint deformities. No muscle wasting  8. Extremities:   No Cyanosis or Clubbing. No significant edema   9. Rectal / genital:   Deferred  10.  Neurological/Psychiatric:  Oriented to time, place, and person  Non-anxious    Lab Results   Component Value Date/Time    TROPONINT <0.010 03/05/2023 05:29 PM    TROPONINT <0.010 03/03/2023 06:02 PM     BNP:  No results found for: BNP, PROBNP  PT/INR:  No results found for: PTINR  No results found for: LABA1C  No results found for: CHOL, TRIG, HDL, LDLCALC, LDLDIRECT, CHOLHDLRATIO  Lab Results   Component Value Date    ALT 14 03/05/2023    ALT 15 03/03/2023    AST 19 03/05/2023    AST 21 03/03/2023     BMP:    Lab Results   Component Value Date/Time     03/05/2023 05:29 PM     03/03/2023 03:00 PM    K 4.0 03/05/2023 05:29 PM    K 3.8 03/03/2023 03:00 PM     03/05/2023 05:29 PM     03/03/2023 03:00 PM    CO2 25 03/05/2023 05:29 PM    CO2 29 03/03/2023 03:00 PM    BUN 15 03/05/2023 05:29 PM    BUN 18 03/03/2023 03:00 PM    CREATININE 0.5 03/05/2023 05:29 PM    CREATININE 0.6 03/03/2023 03:00 PM     CMP:   Lab Results   Component Value Date/Time     03/05/2023 05:29 PM     03/03/2023 03:00 PM    K 4.0 03/05/2023 05:29 PM    K 3.8 03/03/2023 03:00 PM     03/05/2023 05:29 PM     03/03/2023 03:00 PM    CO2 25 03/05/2023 05:29 PM    CO2 29 03/03/2023 03:00 PM    BUN 15 03/05/2023 05:29 PM    BUN 18 03/03/2023 03:00 PM    CREATININE 0.5 03/05/2023 05:29 PM    CREATININE 0.6 03/03/2023 03:00 PM    PROT 6.3 03/05/2023 05:29 PM    PROT 6.4 03/03/2023 03:00 PM     TSH:  No results found for: TSH, TSHHS    Echo: not done  Stress Cardiolyte: not done  EKG: normal sinus rhythm 90/min, ST-T abnormalities V1 to V3.     QUALITY MEASURES REVIEWED:  1.CAD:Patient is taking anti platelet agent:Yes  Patient does not have Hx of documented CAD  2.DYSLIPIDEMIA: Patient is on cholesterol lowering medication:Yes   3.Beta-Blocker therapy for CAD, if prior Myocardial Infarction:Yes  4.Counselled regarding smoking cessation. No   Patient does not Smoke.  5.Anticoagulation therapy (for A.Fib) No   Does Not have A.Fib.  6.Discussed weight management strategies.      Assessment, Recommendations & Tests:     CHEST PAIN: With Abnormal EKG. Will check lexiscan Cardiolite for risk stratification    DYSLIPIDEMIA: Cholesterol 273, , HDL 54 & Triglycerides. Patient was on Lipitor 20 mg daily but not on any more.    TACHYCARDIA: Better with Atenolol. Check Echo.    I spent about 30 min. of time in review of the available data, chart Prep., interviewing patient, obtaining history, performing physical exam, going through decision making analysis for assessment & plans of management on this patient.    Office Visit for test results.     Eddie Burkett MD, 3/14/2023 10:51 AM

## 2023-03-14 NOTE — PATIENT INSTRUCTIONS
CHEST PAIN: With Abnormal EKG. Will check lexiscan Cardiolite for risk stratification    DYSLIPIDEMIA: Cholesterol 273, , HDL 54 & Triglycerides. Patient was on Lipitor 20 mg daily but not on any more. TACHYCARDIA: Better with Atenolol. Check Echo. I spent about 30 min. of time in review of the available data, chart Prep., interviewing patient, obtaining history, performing physical exam, going through decision making analysis for assessment & plans of management on this patient. Office Visit for test results.

## 2023-03-23 ENCOUNTER — PROCEDURE VISIT (OUTPATIENT)
Dept: CARDIOLOGY CLINIC | Age: 48
End: 2023-03-23

## 2023-03-23 DIAGNOSIS — R07.89 OTHER CHEST PAIN: ICD-10-CM

## 2023-03-23 DIAGNOSIS — R00.0 TACHYCARDIA: ICD-10-CM

## 2023-03-23 DIAGNOSIS — E78.5 DYSLIPIDEMIA: ICD-10-CM

## 2023-03-23 DIAGNOSIS — R07.89 OTHER CHEST PAIN: Primary | ICD-10-CM

## 2023-03-23 LAB
LV EF: 58 %
LV EF: 67 %
LVEF MODALITY: NORMAL
LVEF MODALITY: NORMAL

## 2023-03-23 RX ORDER — ATORVASTATIN CALCIUM 20 MG/1
20 TABLET, FILM COATED ORAL DAILY
Qty: 30 TABLET | Refills: 5 | Status: SHIPPED | OUTPATIENT
Start: 2023-03-23

## 2023-03-25 ENCOUNTER — TELEPHONE (OUTPATIENT)
Dept: CARDIOLOGY CLINIC | Age: 48
End: 2023-03-25

## 2023-03-25 NOTE — TELEPHONE ENCOUNTER
Echo   Left ventricular function and size is normal, EF is estimated at 55-60%. Mild left ventricular hypertrophy. No evidence of diastolic dysfunction. No regional wall motion abnormalities were detected. No significant valvular abnormalities. Mild mitral and tricuspid regurgitation is present. RVSP is 23 mmHg. No evidence of pericardial effusion. Nm    Normal study. Normal perfusion study with normal distribution in all coronal, short, and    horizontal axis. The observed defect is consistent with diaphragmatic attenuation. Normal LV function. LVEF is 67 %.        Patient verbally understood

## 2023-03-29 ENCOUNTER — OFFICE VISIT (OUTPATIENT)
Dept: CARDIOLOGY CLINIC | Age: 48
End: 2023-03-29

## 2023-03-29 VITALS
HEIGHT: 61 IN | WEIGHT: 133.2 LBS | SYSTOLIC BLOOD PRESSURE: 130 MMHG | BODY MASS INDEX: 25.15 KG/M2 | HEART RATE: 84 BPM | DIASTOLIC BLOOD PRESSURE: 80 MMHG

## 2023-03-29 DIAGNOSIS — R07.89 OTHER CHEST PAIN: Primary | ICD-10-CM

## 2023-03-29 DIAGNOSIS — R00.0 TACHYCARDIA: ICD-10-CM

## 2023-03-29 DIAGNOSIS — E78.5 DYSLIPIDEMIA: ICD-10-CM

## 2023-03-29 PROCEDURE — 99213 OFFICE O/P EST LOW 20 MIN: CPT | Performed by: INTERNAL MEDICINE

## 2023-03-29 NOTE — PROGRESS NOTES
OFFICE PROGRESS NOTES      Nhung Vanegas is a 46 y.o. female who has    CHIEF COMPLAINT AS FOLLOWS:  CHEST PAIN: Patient C/O chest pain but symptoms appear to be atypical.   SOB: No C/O SOB at this time. LEG EDEMA: No leg edema                            PALPITATIONS: Denies any C/O Palpitations                               DIZZINESS: Felt unbalanced last night & reportedly her BP was high. .   SYNCOPE: None   OTHER/ ADDITIONAL COMPLAINTS:                                     HPI: Patient is here for F/U on her chest pain & Palpitations problems                 Current Outpatient Medications   Medication Sig Dispense Refill    atorvastatin (LIPITOR) 20 MG tablet Take 1 tablet by mouth daily 30 tablet 5    PARoxetine (PAXIL) 10 MG tablet Take 10 mg by mouth 3 times daily      HYDROcodone-acetaminophen (NORCO) 5-325 MG per tablet Take 1 tablet by mouth every 6 hours as needed for Pain. aspirin 81 MG EC tablet Take 81 mg by mouth daily      naproxen (NAPROSYN) 500 MG tablet Take 1 tablet by mouth 2 times daily 60 tablet 0    atenolol (TENORMIN) 50 MG tablet Take 50 mg by mouth daily      albuterol sulfate HFA (PROVENTIL;VENTOLIN;PROAIR) 108 (90 Base) MCG/ACT inhaler Inhale 2 puffs into the lungs every 6 hours as needed for Wheezing       No current facility-administered medications for this visit. Allergies:  Iv [iodides] and Penicillins  Review of Systems:    Constitutional: Negative for diaphoresis and fatigue  Respiratory: Negative for shortness of breath  Cardiovascular: Negative for chest pain, dyspnea on exertion, claudication, edema, irregular heartbeat, murmur, palpitations or shortness of breath  Musculoskeletal: Negative for muscle pain, muscular weakness, negative for pain in arm and leg or swelling in foot and leg    Objective:  /80 (Site: Right Upper Arm, Position: Sitting, Cuff Size: Medium Adult)   Pulse 84   Ht 5' 1\" (1.549 m)   Wt 133 lb 3.2 oz (60.4 kg)   BMI 25.17 kg/m²

## 2023-03-29 NOTE — PATIENT INSTRUCTIONS
CHEST PAIN: most likely non Cardiac.  3/23/2023   Normal study. Normal perfusion study with normal distribution in all coronal, short, and    horizontal axis. The observed defect is consistent with diaphragmatic attenuation. Normal LV function. LVEF is 67 %. DYSLIPIDEMIA: yes,   Patient's profile is not available  Tolerating current medical regimen well yes. Takes Lipitor    Palpitations: Better with Atenolol. Echo 3/23/2023   Left ventricular function and size is normal, EF is estimated at 55-60%. Mild left ventricular hypertrophy. No evidence of diastolic dysfunction. No regional wall motion abnormalities were detected. No significant valvular abnormalities. Mild mitral and tricuspid regurgitation is present. RVSP is 23 mmHg. No evidence of pericardial effusion. .    TESTS ORDERED:none this visit     PREVIOUSLY ORDERED TESTS REVIEWED & DISCUSSED WITH THE PATIENT:     I personally reviewed & interpreted, all previously ordered tests as copied above. Latest Labs are pulled in to the note with dates. Labs, specially in Reference to Lipid profile, Cardiac testing in the form of Echo ( dated: ), stress tests ( dated: ) & other relevant cardiac testing reviewed with patient & recommendations made based on assessment of the results. Discussed role of Cardiac risk factors & effects + treatment of co morbidities with patient & advised accordingly. MEDICATIONS: List of medications patient is currently taking is reviewed in detail with the patient. Discussed any side effects or problems taking the medication. Recommend Continue present management & medications as listed. AFFIRMATION: I spent at least 20 minutes of time reviewing patient's history, previous & current medical problems & all Labs + testing. This includes chart prep even prior to the vosit. Various goals are discussed and multiple questions answered. Relevant concelling performed. Office follow up in six months.

## 2023-03-29 NOTE — LETTER
March 29, 2023      Marisela Burkitt, [address] [address]      Patient: Vikki Alvarado   MR Number: [id number]   YOB: 1975   Date of Visit: 3/29/2023       Dear Marisela Burkitt:    Thank you for referring Vikki Alvarado to me for evaluation/treatment. Below are the relevant portions of my assessment and plan of care. If you have questions, please do not hesitate to call me. I look forward to following Deniz Campbell along with you.     Sincerely,        Mylene العراقي MD
dysfunction. No regional wall motion abnormalities were detected. No significant valvular abnormalities. Mild mitral and tricuspid regurgitation is present. RVSP is 23 mmHg. No evidence of pericardial effusion    CAROTID: NONE    MUGA: NONE    LAST PACER CHECK: NONE    CARDIAC CATH: NONE    Amio Protocol:none        CHADS: [id number] Score for Atrial Fibrillation Stroke Risk   Risk   Factors  Component Value   C CHF No 0   H HTN No 0   A2 Age >= 75 No,  (46 y.o.) 0   D DM No 0   S2 Prior Stroke/TIA No 0   V Vascular Disease No 0   A Age 74-69 No,  (52 y.o.) 0   Sc Sex female 1    [id number]  Score  1   Score last updated 3/29/23 8:03 AM EDT    Click here for a link to the UpToDate guideline \"Atrial Fibrillation: Anticoagulation therapy to prevent embolization    Disclaimer: Risk Score calculation is dependent on accuracy of patient problem list and past encounter diagnosis.